# Patient Record
Sex: FEMALE | Race: WHITE | NOT HISPANIC OR LATINO | URBAN - NONMETROPOLITAN AREA
[De-identification: names, ages, dates, MRNs, and addresses within clinical notes are randomized per-mention and may not be internally consistent; named-entity substitution may affect disease eponyms.]

---

## 2021-04-08 ENCOUNTER — IMPORTED ENCOUNTER (OUTPATIENT)
Dept: URBAN - NONMETROPOLITAN AREA CLINIC 1 | Facility: CLINIC | Age: 56
End: 2021-04-08

## 2021-04-08 PROBLEM — H25.813: Noted: 2021-04-08

## 2021-04-08 PROBLEM — H52.4: Noted: 2021-04-08

## 2021-04-08 PROBLEM — H52.13: Noted: 2021-04-08

## 2021-04-08 PROCEDURE — 92015 DETERMINE REFRACTIVE STATE: CPT

## 2021-04-08 PROCEDURE — 92310 CONTACT LENS FITTING OU: CPT

## 2021-04-08 PROCEDURE — 92004 COMPRE OPH EXAM NEW PT 1/>: CPT

## 2021-04-08 NOTE — PATIENT DISCUSSION
Myopia/Presbyopia-  Discussed refractive status w/ pt today-  MR done new GLRx -  New CLRx given for OD and trial pair for OS to use only when driving PRN if so desired. -  Continue to monitor PRNCombined Cataracts OU- Discussed diagnosis in detail with patient- Discussed signs and symptoms of progression- Discussed UV protection - Continue to monitor; 's Notes: MR  4/8/2021DFE

## 2022-04-09 ASSESSMENT — KERATOMETRY
OS_K2POWER_DIOPTERS: 44.00
OS_K1POWER_DIOPTERS: 43.00
OS_AXISANGLE_DEGREES: 145
OD_AXISANGLE_DEGREES: 053
OD_K2POWER_DIOPTERS: 43.25
OD_K1POWER_DIOPTERS: 42.25

## 2022-04-09 ASSESSMENT — VISUAL ACUITY
OS_SC: 20/20
OS_CC: 20/20

## 2022-04-09 ASSESSMENT — TONOMETRY
OS_IOP_MMHG: 15
OD_IOP_MMHG: 15

## 2023-09-26 ENCOUNTER — ESTABLISHED PATIENT (OUTPATIENT)
Dept: URBAN - NONMETROPOLITAN AREA CLINIC 1 | Facility: CLINIC | Age: 58
End: 2023-09-26

## 2023-09-26 DIAGNOSIS — H52.4: ICD-10-CM

## 2023-09-26 PROCEDURE — 92310-E CONTACT LENS FITTING ESTABLISH PATIENT

## 2023-09-26 PROCEDURE — 92015 DETERMINE REFRACTIVE STATE: CPT

## 2023-09-26 PROCEDURE — 92014 COMPRE OPH EXAM EST PT 1/>: CPT

## 2023-09-26 ASSESSMENT — VISUAL ACUITY
OU_SC: 20/20-1
OS_SC: 20/20 STRUGGLE
OD_CC: 20/25-2
OU_CC: 20/25-1

## 2023-09-26 ASSESSMENT — KERATOMETRY
OD_K2POWER_DIOPTERS: 43.25
OS_AXISANGLE_DEGREES: 145
OS_K1POWER_DIOPTERS: 43.00
OS_K2POWER_DIOPTERS: 44.00
OD_AXISANGLE_DEGREES: 053
OD_K1POWER_DIOPTERS: 42.25

## 2023-09-26 ASSESSMENT — TONOMETRY
OD_IOP_MMHG: 14
OS_IOP_MMHG: 14

## 2024-10-02 ENCOUNTER — COMPREHENSIVE EXAM (OUTPATIENT)
Dept: URBAN - NONMETROPOLITAN AREA CLINIC 1 | Facility: CLINIC | Age: 59
End: 2024-10-02

## 2024-10-02 DIAGNOSIS — H52.4: ICD-10-CM

## 2024-10-02 PROCEDURE — 92014 COMPRE OPH EXAM EST PT 1/>: CPT

## 2024-10-02 PROCEDURE — 92310-E CONTACT LENS FITTING ESTABLISH PATIENT

## 2024-10-02 PROCEDURE — 92015 DETERMINE REFRACTIVE STATE: CPT

## 2025-01-15 ENCOUNTER — CONSULTATION/EVALUATION (OUTPATIENT)
Age: 60
End: 2025-01-15

## 2025-01-15 DIAGNOSIS — H02.413: ICD-10-CM

## 2025-01-15 PROCEDURE — 99214 OFFICE O/P EST MOD 30 MIN: CPT

## 2025-01-15 PROCEDURE — 92285 EXTERNAL OCULAR PHOTOGRAPHY: CPT

## 2025-03-28 ENCOUNTER — EMERGENCY VISIT (OUTPATIENT)
Age: 60
End: 2025-03-28

## 2025-03-28 DIAGNOSIS — H02.413: ICD-10-CM

## 2025-03-28 DIAGNOSIS — H43.813: ICD-10-CM

## 2025-03-28 DIAGNOSIS — H25.813: ICD-10-CM

## 2025-03-28 PROCEDURE — 92250 FUNDUS PHOTOGRAPHY W/I&R: CPT

## 2025-03-28 PROCEDURE — 99214 OFFICE O/P EST MOD 30 MIN: CPT
